# Patient Record
Sex: MALE | Race: WHITE
[De-identification: names, ages, dates, MRNs, and addresses within clinical notes are randomized per-mention and may not be internally consistent; named-entity substitution may affect disease eponyms.]

---

## 2017-11-20 NOTE — RADIOLOGY REPORT (SQ)
EXAM DESCRIPTION:  C SP 4 OR 5 VIEWS



COMPLETED DATE/TIME:  11/20/2017 11:47 am



REASON FOR STUDY:  OTHER MUSCLE SPASM M62.838  OTHER MUSCLE SPASM



COMPARISON:  None.



NUMBER OF VIEWS:  Five views.



TECHNIQUE:  AP, lateral, obliques and odontoid radiographic images acquired of the cervical spine.



LIMITATIONS:  None.



FINDINGS:  MINERALIZATION: Normal.

ALIGNMENT: Anatomic.

VERTEBRAE: Vertebral bodies of normal height.

DISCS: No significant osteophytes or sclerosis. Disc height maintained.

FORAMINA: No osteophytes or foraminal narrowing.

LATERAL AND POSTERIOR ELEMENTS: Facets, lateral masses and spinous processes without significant find
ings.

HARDWARE: None in the spine.

SOFT TISSUES: No masses or calcifications. Lung apices clear.

OTHER: No other significant finding.



IMPRESSION:  NO SIGNIFICANT RADIOGRAPHIC FINDING IN THE CERVICAL SPINE.



TECHNICAL DOCUMENTATION:  JOB ID:  [id number]

 2011 [employer]- All Rights Reserved

## 2018-11-11 ENCOUNTER — HOSPITAL ENCOUNTER (OUTPATIENT)
Dept: HOSPITAL 62 - RAD | Age: 28
End: 2018-11-11
Payer: COMMERCIAL

## 2018-11-11 DIAGNOSIS — M25.561: Primary | ICD-10-CM

## 2018-11-11 DIAGNOSIS — S83.511A: ICD-10-CM
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
## 2018-11-11 NOTE — RADIOLOGY REPORT (SQ)
EXAM DESCRIPTION:  MRI RT LOWER JOINT WITHOUT



COMPLETED DATE/TIME:  11/11/2018 2:54 pm



REASON FOR STUDY:  RT KNEE PAIN M25.561  PAIN IN RIGHT KNEE



COMPARISON:  None.



TECHNIQUE:  Rightknee images acquired and stored on PACS.  Multiplanar images include fat sensitive s
equences as T1, water sensitive sequences as FST2 or STIR, cartilage sensitive sequences as FSPD, and
 gradient echo sequences.



LIMITATIONS:  None.



FINDINGS:  JOINT AND BURSAE: Moderate effusion.

BONE CORTEX AND MARROW: Mild contusion in the posterior tibia, particularly laterally.  Mild contusio
n in the lateral femoral condyle.

ACL: Complete disruption.  Ill-defined fibers.

PCL: Intact.

MCL: Intact. No periligamentous edema or fluid.

LCL: Intact. No periligamentous edema or fluid.

MEDIAL MENISCUS: No tears. No abnormal signal.

LATERAL MENISCUS: Heterogeneous signal in the posterior root is presumably physiologic, likely partia
lly due to meniscofemoral ligament.

MEDIAL COMPARTMENT: No focal chondral defects.

LATERAL COMPARTMENT: No focal chondral defects.

PATELLA: No chondromalacia. No subchondral cysts. Medial and lateral retinacula intact.

EXTENSOR MECHANISM: Intact. Quadriceps and patella tendons normal.

SOFT TISSUES: Adjacent muscles and subcutaneous tissues normal.  Normal flow void in popliteal artery
 and vein.

OTHER: No other significant finding.



IMPRESSION:  1.  Complete ACL tear.  2. Probable physiologic changes posterior horn lateral meniscus.




TECHNICAL DOCUMENTATION:  JOB ID:  [id number]

 2011 [employer]- All Rights Reserved



Reading location - IP/workstation name: [email or web address]